# Patient Record
Sex: FEMALE | Race: WHITE | NOT HISPANIC OR LATINO | URBAN - METROPOLITAN AREA
[De-identification: names, ages, dates, MRNs, and addresses within clinical notes are randomized per-mention and may not be internally consistent; named-entity substitution may affect disease eponyms.]

---

## 2017-08-22 ENCOUNTER — IMPORTED ENCOUNTER (OUTPATIENT)
Dept: URBAN - METROPOLITAN AREA CLINIC 1 | Facility: CLINIC | Age: 47
End: 2017-08-22

## 2017-08-22 PROBLEM — H25.813: Noted: 2017-08-22

## 2017-08-22 PROBLEM — E11.9: Noted: 2017-08-22

## 2017-08-22 PROBLEM — H04.123: Noted: 2017-08-22

## 2017-08-22 PROBLEM — Z79.4: Noted: 2017-08-22

## 2017-08-22 PROCEDURE — 92015 DETERMINE REFRACTIVE STATE: CPT

## 2017-08-22 PROCEDURE — 92014 COMPRE OPH EXAM EST PT 1/>: CPT

## 2017-08-22 NOTE — PATIENT DISCUSSION
1.  DM Type II without sign of diabetic retinopathy and no blot heme on dilated retinal examination today OU No Macular Edema: Stable. Discussed the pathophysiology of diabetes and its effect on the eye and risk of blindness. Stressed the importance of strong glucose control. Advised of importance of at least yearly dilated examinations but to contact us immediately for any problems or concerns. 2. Type II Insulin dependent diabetes mellitus3. Dry Eyes OU- Much improved. The continuation of artificial tears were recommended. 4.  Cataract OU- Observe for now without intervention. The patient was advised to contact us if any change or worsening of vision5. 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 6.  Return for an appointment for a 30/HVf in 1 year with Dr. Munira Valle.

## 2018-08-21 ENCOUNTER — IMPORTED ENCOUNTER (OUTPATIENT)
Dept: URBAN - METROPOLITAN AREA CLINIC 1 | Facility: CLINIC | Age: 48
End: 2018-08-21

## 2018-08-21 PROBLEM — H04.123: Noted: 2018-08-21

## 2018-08-21 PROBLEM — E11.9: Noted: 2018-08-21

## 2018-08-21 PROBLEM — Z79.4: Noted: 2018-08-21

## 2018-08-21 PROBLEM — H25.813: Noted: 2018-08-21

## 2018-08-21 PROBLEM — H16.143: Noted: 2018-08-21

## 2018-08-21 PROCEDURE — 92083 EXTENDED VISUAL FIELD XM: CPT

## 2018-08-21 PROCEDURE — 92014 COMPRE OPH EXAM EST PT 1/>: CPT

## 2018-08-21 PROCEDURE — 92015 DETERMINE REFRACTIVE STATE: CPT

## 2018-08-21 NOTE — PATIENT DISCUSSION
1.  DM Type II without sign of diabetic retinopathy and no blot heme on dilated retinal examination today OU No Macular Edema: Stable. Discussed the pathophysiology of diabetes and its effect on the eye and risk of blindness. Stressed the importance of strong glucose control. Advised of importance of at least yearly dilated examinations but to contact us immediately for any problems or concerns. 2. Type II Insulin dependent diabetes mellitus3. REFUGIO OU now w/ PEK OU- The increase of artificial tears OU to QID were recommended routinely. Consider plugs if no  improvement. 4.  Cataract OU: Observe for now without intervention. The patient was advised to contact us if any change or worsening of vision5. Optic Nerve Head Drusen OU- Stable IOP OU. Normal HVF OU. 6. Return for an appointment for 10/check K in 6 months with Dr. Shanelle Corbett.

## 2019-03-08 ENCOUNTER — IMPORTED ENCOUNTER (OUTPATIENT)
Dept: URBAN - METROPOLITAN AREA CLINIC 1 | Facility: CLINIC | Age: 49
End: 2019-03-08

## 2019-03-08 PROBLEM — H25.813: Noted: 2019-03-08

## 2019-03-08 PROBLEM — H04.123: Noted: 2019-03-08

## 2019-03-08 PROBLEM — H16.143: Noted: 2019-03-08

## 2019-03-08 PROCEDURE — 99213 OFFICE O/P EST LOW 20 MIN: CPT

## 2019-03-08 NOTE — PATIENT DISCUSSION
1.  REFUGIO w/ improved PEK OU- Recommend ATs TID-QID OU routinely. Consider plugs if symptoms/PEK worsens 2. Cataract OU: Observe for now without intervention. The patient was advised to contact us if any change or worsening of vision3. H/o Dm w/o DR OU 4. H/o Optic Nerve Head Drusen OUReturn for an appointment in August 30 with Dr. Yaquelin Leong.

## 2019-08-20 ENCOUNTER — IMPORTED ENCOUNTER (OUTPATIENT)
Dept: URBAN - METROPOLITAN AREA CLINIC 1 | Facility: CLINIC | Age: 49
End: 2019-08-20

## 2019-08-20 PROBLEM — H04.123: Noted: 2019-08-20

## 2019-08-20 PROBLEM — Z79.4: Noted: 2019-08-20

## 2019-08-20 PROBLEM — H25.813: Noted: 2019-08-20

## 2019-08-20 PROBLEM — H16.143: Noted: 2019-08-20

## 2019-08-20 PROBLEM — E11.9: Noted: 2019-08-20

## 2019-08-20 PROCEDURE — 92014 COMPRE OPH EXAM EST PT 1/>: CPT

## 2019-08-20 NOTE — PATIENT DISCUSSION
1.  DM Type II (Insulin) without sign of diabetic retinopathy and no blot heme on dilated retinal examination today OU No Macular Edema:  Discussed the pathophysiology of diabetes and its effect on the eye and risk of blindness. Stressed the importance of strong glucose control. Advised of importance of at least yearly dilated examinations but to contact us immediately for any problems or concerns. 2. REFUGIO w/ PEK OU- Cont ATs TID-QID OU routinely 3. Cataract OU: Observe for now without intervention. The patient was advised to contact us if any change or worsening of vision4. Optic Nerve Head Drusen OUPatient defers the refraction at today's visitReturn for an appointment in 1 year 27 with Dr. Nishi De La Cruz.

## 2020-08-20 ENCOUNTER — IMPORTED ENCOUNTER (OUTPATIENT)
Dept: URBAN - METROPOLITAN AREA CLINIC 1 | Facility: CLINIC | Age: 50
End: 2020-08-20

## 2020-08-20 PROBLEM — E11.9: Noted: 2020-08-20

## 2020-08-20 PROBLEM — H04.123: Noted: 2020-08-20

## 2020-08-20 PROBLEM — H16.143: Noted: 2020-08-20

## 2020-08-20 PROBLEM — H25.813: Noted: 2020-08-20

## 2020-08-20 PROBLEM — Z79.4: Noted: 2020-08-20

## 2020-08-20 PROCEDURE — 92014 COMPRE OPH EXAM EST PT 1/>: CPT

## 2020-08-20 NOTE — PATIENT DISCUSSION
1.  DM Type II (Insulin) without sign of diabetic retinopathy and no blot heme on dilated retinal examination today OU No Macular Edema:  Discussed the pathophysiology of diabetes and its effect on the eye and risk of blindness. Stressed the importance of strong glucose control. Advised of importance of at least yearly dilated examinations but to contact us immediately for any problems or concerns. 2. Cataract OU - Observe for now without intervention. The patient was advised to contact us if any change or worsening of vision3. REFUGIO w/ PEK OU - Cont ATs TID-QID OU routinely 4. Optic Nerve Head Drusen OUPatient defers the refraction at today's visitLetter to PCP. Return for an appointment in 1 year for a 30 with Dr. Orville Wild.

## 2021-12-13 ENCOUNTER — IMPORTED ENCOUNTER (OUTPATIENT)
Dept: URBAN - METROPOLITAN AREA CLINIC 1 | Facility: CLINIC | Age: 51
End: 2021-12-13

## 2021-12-13 PROBLEM — H25.813: Noted: 2021-12-13

## 2021-12-13 PROBLEM — H40.033: Noted: 2021-12-13

## 2021-12-13 PROBLEM — H04.123: Noted: 2021-12-13

## 2021-12-13 PROBLEM — E11.9: Noted: 2021-12-13

## 2021-12-13 PROBLEM — Z79.4: Noted: 2021-12-13

## 2021-12-13 PROBLEM — H16.143: Noted: 2021-12-13

## 2021-12-13 PROCEDURE — 99214 OFFICE O/P EST MOD 30 MIN: CPT

## 2021-12-13 PROCEDURE — 92015 DETERMINE REFRACTIVE STATE: CPT

## 2021-12-13 NOTE — PATIENT DISCUSSION
1.  DM Type II (Insulin) without sign of diabetic retinopathy and no blot heme on dilated retinal examination today OU No Macular Edema:  Discussed the pathophysiology of diabetes and its effect on the eye and risk of blindness. Stressed the importance of strong glucose control. Advised of importance of at least yearly dilated examinations but to contact us immediately for any problems or concerns. 2. Cataract OU -- Observe for now without intervention. The patient was advised to contact us if any change or worsening of vision3. REFUGIO w/ PEK OU -- Cont ATs TID-QID OU routinely 4. Optic Nerve Head Drusen OU 5. Narrow Angle OU -- Observe. Return for an appointment in 1 year for a 30 with Dr. Marni Yang.

## 2022-04-02 ASSESSMENT — VISUAL ACUITY
OS_CC: 20/25-2
OS_CC: 20/20
OD_CC: 20/25-2
OD_CC: 20/20
OS_CC: 20/20
OS_CC: 20/20-1
OD_CC: 20/25
OS_CC: 20/20-1
OD_CC: 20/25
OD_CC: 20/20
OD_CC: 20/20-1
OS_CC: 20/25

## 2022-04-02 ASSESSMENT — TONOMETRY
OD_IOP_MMHG: 15
OS_IOP_MMHG: 18
OD_IOP_MMHG: 15
OD_IOP_MMHG: 18
OD_IOP_MMHG: 14
OS_IOP_MMHG: 15
OD_IOP_MMHG: 15
OD_IOP_MMHG: 15
OS_IOP_MMHG: 15
OS_IOP_MMHG: 14
OS_IOP_MMHG: 15
OD_IOP_MMHG: 14
OS_IOP_MMHG: 14
OS_IOP_MMHG: 15

## 2022-04-02 ASSESSMENT — KERATOMETRY
OS_AXISANGLE_DEGREES: 170
OS_K1POWER_DIOPTERS: 47.00
OS_K2POWER_DIOPTERS: 48.25
OD_AXISANGLE2_DEGREES: 097
OS_AXISANGLE2_DEGREES: 080
OD_K2POWER_DIOPTERS: 48.75
OD_AXISANGLE_DEGREES: 007
OD_K1POWER_DIOPTERS: 47.00

## 2022-12-13 ENCOUNTER — COMPREHENSIVE EXAM (OUTPATIENT)
Dept: URBAN - METROPOLITAN AREA CLINIC 1 | Facility: CLINIC | Age: 52
End: 2022-12-13

## 2022-12-13 PROCEDURE — 92014 COMPRE OPH EXAM EST PT 1/>: CPT

## 2022-12-13 ASSESSMENT — KERATOMETRY
OS_K1POWER_DIOPTERS: 47.00
OS_AXISANGLE_DEGREES: 170
OD_AXISANGLE2_DEGREES: 097
OS_AXISANGLE2_DEGREES: 080
OS_K2POWER_DIOPTERS: 48.25
OD_AXISANGLE_DEGREES: 007
OD_K1POWER_DIOPTERS: 47.00
OD_K2POWER_DIOPTERS: 48.75

## 2022-12-13 ASSESSMENT — TONOMETRY
OS_IOP_MMHG: 18
OD_IOP_MMHG: 18

## 2022-12-13 ASSESSMENT — VISUAL ACUITY
OS_SC: 20/25
OD_SC: 20/25

## 2023-12-14 ENCOUNTER — COMPREHENSIVE EXAM (OUTPATIENT)
Dept: URBAN - METROPOLITAN AREA CLINIC 1 | Facility: CLINIC | Age: 53
End: 2023-12-14

## 2023-12-14 DIAGNOSIS — H16.143: ICD-10-CM

## 2023-12-14 DIAGNOSIS — H25.813: ICD-10-CM

## 2023-12-14 DIAGNOSIS — E11.3292: ICD-10-CM

## 2023-12-14 DIAGNOSIS — H01.021: ICD-10-CM

## 2023-12-14 DIAGNOSIS — H04.123: ICD-10-CM

## 2023-12-14 DIAGNOSIS — Z79.4: ICD-10-CM

## 2023-12-14 DIAGNOSIS — H01.024: ICD-10-CM

## 2023-12-14 DIAGNOSIS — H40.033: ICD-10-CM

## 2023-12-14 PROCEDURE — 92015 DETERMINE REFRACTIVE STATE: CPT

## 2023-12-14 PROCEDURE — 92014 COMPRE OPH EXAM EST PT 1/>: CPT

## 2023-12-14 ASSESSMENT — TONOMETRY
OS_IOP_MMHG: 15
OD_IOP_MMHG: 14

## 2023-12-14 ASSESSMENT — VISUAL ACUITY
OS_SC: 20/20
OD_SC: 20/20

## 2024-12-16 ENCOUNTER — COMPREHENSIVE EXAM (OUTPATIENT)
Age: 54
End: 2024-12-16

## 2024-12-16 DIAGNOSIS — Z79.4: ICD-10-CM

## 2024-12-16 DIAGNOSIS — H04.123: ICD-10-CM

## 2024-12-16 DIAGNOSIS — E11.3292: ICD-10-CM

## 2024-12-16 DIAGNOSIS — H16.143: ICD-10-CM

## 2024-12-16 DIAGNOSIS — H25.813: ICD-10-CM

## 2024-12-16 DIAGNOSIS — H40.033: ICD-10-CM

## 2024-12-16 PROCEDURE — 92014 COMPRE OPH EXAM EST PT 1/>: CPT

## 2025-06-05 ENCOUNTER — HOSPITAL ENCOUNTER (OUTPATIENT)
Facility: HOSPITAL | Age: 55
Setting detail: RECURRING SERIES
Discharge: HOME | End: 2025-06-08
Payer: COMMERCIAL

## 2025-06-05 PROCEDURE — 97162 PT EVAL MOD COMPLEX 30 MIN: CPT

## 2025-06-05 NOTE — PROGRESS NOTES
assigned and reassessed every 10 visits/ 30 days per Medicare guidelines    Patient/ Caregiver education and instruction: Diagnosis, prognosis, self care and exercises [x]  Plan of care has been reviewed with HI Ocampo, PT       6/5/2025       8:13 AM  ===================================================================  I certify that the above Therapy Services are being furnished while the patient is under my care. I agree with the treatment plan and certify that this therapy is necessary.    Physician's Signature:_________________________   DATE:_________   TIME:________                           Maryjane Díaz, *    ** Signature, Date and Time must be completed for valid certification **  [facility] or you may fax the signed copy to [phone].  Thank you.      
toward goals / Updated goals:  [x]  See POC    Short Term Goals: To be accomplished in 2 treatments:   Pt will demonstrate 100% compliance with her initial HEP to promote improved carryover.   Status at IE: Given handout and reported understanding.     Long Term Goals: To be accomplished in 8-12 treatments:    Pt will demonstrate decreased pain 0/10 in order to improve ability to participate in recreational activities.  Status at IE: pain at worst 9/10, best 0/10.    Pt will demonstrate improved IR with adduction difference to 0 cm in order to improve ability to don/doff bra.  Status at IE: 10 cm difference.  3.      Pt will demonstrate scapular strength to 5/5 in order to improve posture.  Status at IE: Middle trap 3/5, Lower trap 3/5.  4.      Pt will demonstrate shoulder strength to 5/5 in order to improve ability to participate in recreational activities.  Status at IE: flexors, IR, ER, abduction 4/5.       PLAN  yes Continue plan of care  []  Upgrade activities as tolerated  []  Discharge due to :  [x]  Other: Pt to be seen 2x/week for 8-12 visits     Justification for Eval Code Complexity: Moderate.  Patient History: Moderate - Diabetes.  Examination see exam: High  Clinical Presentation: Moderate - evolving.  Clinical Decision Making : QD : 11.4 /100    Torri Ocampo PT    6/5/2025    8:12 AM    Future Appointments   Date Time Provider Department Center   6/5/2025 11:40 AM Torri Ocampo PT [facility]     If an interpreting service was utilized for treatment of this patient, the contents of this document represent the material reviewed with the patient via the .    Charge Capture

## 2025-06-12 ENCOUNTER — HOSPITAL ENCOUNTER (OUTPATIENT)
Facility: HOSPITAL | Age: 55
Setting detail: RECURRING SERIES
Discharge: HOME | End: 2025-06-15
Payer: COMMERCIAL

## 2025-06-12 PROCEDURE — 97110 THERAPEUTIC EXERCISES: CPT

## 2025-06-12 PROCEDURE — 97140 MANUAL THERAPY 1/> REGIONS: CPT

## 2025-06-12 PROCEDURE — 97530 THERAPEUTIC ACTIVITIES: CPT

## 2025-06-12 NOTE — PROGRESS NOTES
PHYSICAL / OCCUPATIONAL THERAPY - DAILY TREATMENT NOTE (updated )    Patient Name: Harriet Birmingham    Date: 2025    : 1970  Insurance: Payor: JUSTIN LOPEZ / Plan: [employer] / Product Type: *No Product type* /      Patient  verified yes     Visit #   Current / Total 2 8-12   Time   In / Out 5:23 6:15   Total Treatment Time 52   Pain   In / Out 4/10 3/10   Subjective Functional Status/Changes: It's not too bad right now.      NEXT PROGRESS NOTE DUE: 2025    TREATMENT AREA =  Left shoulder pain    OBJECTIVE    Modalities Rationale:     decrease inflammation and decrease pain to improve patient's ability to progress to PLOF and address remaining functional goals.     min [] Estim Unattended, type/location:                                      []  w/ice    []  w/heat    min [] Estim Attended, type/location:                                     []  w/US     []  w/ice    []  w/heat    []  TENS insruct      min []  Mechanical Traction: type/lbs                   []  pro   []  sup   []  int   []  cont    []  before manual    []  after manual    min []  Ultrasound, settings/location:      min []  Iontophoresis w/ dexamethasone, location:                                               []  take home patch       []  in clinic   10 min  unbilled [x]  Ice     []  Heat    location/position: Supine to Left shoulder    min []  Paraffin,  details:     min []  Vasopneumatic Device, press/temp:     min []  Whirlpool / Fluido:    If using vaso (only need to measure limb vaso being performed on)      pre-treatment girth :       post-treatment girth :       measured at (landmark location) :      min []  Other:    Skin assessment post-treatment (if applicable):    [x]  intact    []  redness- no adverse reaction                 []redness - adverse reaction:      Vasopnuematic compression justification:  Per bilateral girth measures taken and listed above the edema is considered significant and

## 2025-06-17 ENCOUNTER — HOSPITAL ENCOUNTER (OUTPATIENT)
Facility: HOSPITAL | Age: 55
Setting detail: RECURRING SERIES
Discharge: HOME | End: 2025-06-20
Payer: COMMERCIAL

## 2025-06-17 PROCEDURE — 97140 MANUAL THERAPY 1/> REGIONS: CPT | Performed by: PHYSICAL THERAPIST

## 2025-06-17 PROCEDURE — 97110 THERAPEUTIC EXERCISES: CPT | Performed by: PHYSICAL THERAPIST

## 2025-06-17 PROCEDURE — 97530 THERAPEUTIC ACTIVITIES: CPT | Performed by: PHYSICAL THERAPIST

## 2025-06-17 NOTE — PROGRESS NOTES
PHYSICAL / OCCUPATIONAL THERAPY - DAILY TREATMENT NOTE (updated )    Patient Name: Harriet Birmingham    Date: 2025    : 1970  Insurance: Payor: JUSTIN LOPEZ / Plan: [employer] / Product Type: *No Product type* /      Patient  verified yes     Visit #   Current / Total 3 8-12   Time   In / Out 700 804   Total Treatment Time 64   Pain   In / Out 2 0   Subjective Functional Status/Changes: Pt reports that she felt really good after her last appointment and then this weekend she moved quickly and her shoulder spasm..      NEXT PROGRESS NOTE DUE: 2025    TREATMENT AREA =  Left shoulder pain    OBJECTIVE    Modalities Rationale:     decrease inflammation and decrease pain to improve patient's ability to progress to PLOF and address remaining functional goals.     min [] Estim Unattended, type/location:                                      []  w/ice    []  w/heat    min [] Estim Attended, type/location:                                     []  w/US     []  w/ice    []  w/heat    []  TENS insruct      min []  Mechanical Traction: type/lbs                   []  pro   []  sup   []  int   []  cont    []  before manual    []  after manual    min []  Ultrasound, settings/location:      min []  Iontophoresis w/ dexamethasone, location:                                               []  take home patch       []  in clinic   15 min  unbilled [x]  Ice     []  Heat    location/position: Supine to Left shoulder    min []  Paraffin,  details:     min []  Vasopneumatic Device, press/temp:     min []  Whirlpool / Fluido:    If using vaso (only need to measure limb vaso being performed on)      pre-treatment girth :       post-treatment girth :       measured at (landmark location) :      min []  Other:    Skin assessment post-treatment (if applicable):    [x]  intact    []  redness- no adverse reaction                 []redness - adverse reaction:      Vasopnuematic compression justification:

## 2025-06-19 ENCOUNTER — HOSPITAL ENCOUNTER (OUTPATIENT)
Facility: HOSPITAL | Age: 55
Setting detail: RECURRING SERIES
Discharge: HOME | End: 2025-06-22
Payer: COMMERCIAL

## 2025-06-19 PROCEDURE — 97110 THERAPEUTIC EXERCISES: CPT

## 2025-06-19 PROCEDURE — 97530 THERAPEUTIC ACTIVITIES: CPT

## 2025-06-19 PROCEDURE — 97140 MANUAL THERAPY 1/> REGIONS: CPT

## 2025-06-19 NOTE — PROGRESS NOTES
PHYSICAL / OCCUPATIONAL THERAPY - DAILY TREATMENT NOTE (updated )    Patient Name: Harriet Birmingham    Date: 2025    : 1970  Insurance: Payor: JUSTIN LOPEZ / Plan: [employer] / Product Type: *No Product type* /      Patient  verified yes     Visit #   Current / Total 4 8-12   Time   In / Out 935 1040   Total Treatment Time 65   Pain   In / Out 2 1-2   Subjective Functional Status/Changes: Pt reports she sometimes feels a jolt or pulsing on the L side when at rest.     NEXT PROGRESS NOTE DUE: 2025    TREATMENT AREA =  Left shoulder pain    OBJECTIVE    Modalities Rationale:     decrease inflammation and decrease pain to improve patient's ability to progress to PLOF and address remaining functional goals.     min [] Estim Unattended, type/location:                                      []  w/ice    []  w/heat    min [] Estim Attended, type/location:                                     []  w/US     []  w/ice    []  w/heat    []  TENS insruct      min []  Mechanical Traction: type/lbs                   []  pro   []  sup   []  int   []  cont    []  before manual    []  after manual    min []  Ultrasound, settings/location:      min []  Iontophoresis w/ dexamethasone, location:                                               []  take home patch       []  in clinic   7 min  unbilled [x]  Ice     []  Heat    location/position: Supine to Left shoulder    min []  Paraffin,  details:     min []  Vasopneumatic Device, press/temp:     min []  Whirlpool / Fluido:    If using vaso (only need to measure limb vaso being performed on)      pre-treatment girth :       post-treatment girth :       measured at (landmark location) :      min []  Other:    Skin assessment post-treatment (if applicable):    [x]  intact    []  redness- no adverse reaction                 []redness - adverse reaction:      Vasopnuematic compression justification:  Per bilateral girth measures taken and listed above

## 2025-06-24 ENCOUNTER — HOSPITAL ENCOUNTER (OUTPATIENT)
Facility: HOSPITAL | Age: 55
Setting detail: RECURRING SERIES
Discharge: HOME | End: 2025-06-27
Payer: COMMERCIAL

## 2025-06-24 PROCEDURE — 97530 THERAPEUTIC ACTIVITIES: CPT

## 2025-06-24 PROCEDURE — 97140 MANUAL THERAPY 1/> REGIONS: CPT

## 2025-06-24 PROCEDURE — 97110 THERAPEUTIC EXERCISES: CPT

## 2025-06-24 PROCEDURE — 97112 NEUROMUSCULAR REEDUCATION: CPT

## 2025-06-24 NOTE — PROGRESS NOTES
PHYSICAL / OCCUPATIONAL THERAPY - DAILY TREATMENT NOTE (updated )    Patient Name: Harriet Birmingham    Date: 2025    : 1970  Insurance: Payor: JUSTIN LOPEZ / Plan: [employer] / Product Type: *No Product type* /      Patient  verified yes     Visit #   Current / Total 5 8-12   Time   In / Out 7:00 8:07   Total Treatment Time 67   Pain   In / Out 2 2   Subjective Functional Status/Changes: Tightness in the shoulder, usually upon waking     NEXT PROGRESS NOTE DUE: 2025    TREATMENT AREA =  Left shoulder pain    OBJECTIVE    Modalities Rationale:     decrease inflammation and decrease pain to improve patient's ability to progress to PLOF and address remaining functional goals.     min [] Estim Unattended, type/location:                                      []  w/ice    []  w/heat    min [] Estim Attended, type/location:                                     []  w/US     []  w/ice    []  w/heat    []  TENS insruct      min []  Mechanical Traction: type/lbs                   []  pro   []  sup   []  int   []  cont    []  before manual    []  after manual    min []  Ultrasound, settings/location:      min []  Iontophoresis w/ dexamethasone, location:                                               []  take home patch       []  in clinic   10 min  unbilled [x]  Ice     []  Heat    location/position: Supine to Left shoulder    min []  Paraffin,  details:     min []  Vasopneumatic Device, press/temp:     min []  Whirlpool / Fluido:    If using vaso (only need to measure limb vaso being performed on)      pre-treatment girth :       post-treatment girth :       measured at (landmark location) :      min []  Other:    Skin assessment post-treatment (if applicable):    [x]  intact    []  redness- no adverse reaction                 []redness - adverse reaction:      Vasopnuematic compression justification:  Per bilateral girth measures taken and listed above the edema is considered

## 2025-06-24 NOTE — PROGRESS NOTES
[facility]  [address] [place] [address]  Phone: [phone]   [phone]  PHYSICAL THERAPY PROGRESS NOTE  Patient Name: Harriet Birmingham : 1970   Treatment/Medical Diagnosis: Left shoulder pain   Referral Source: Maryjane Díaz, Sigifredo     Date of Initial Visit: 2025 Attended Visits: 5 Missed Visits: 0     SUMMARY OF TREATMENT  Pt attending 5 visits since their SOC for L shoulder pain, SOC on 2024. Therapy has consisted of therapeutic exercise, neuromuscular re-education, manual therapy, therapeutic activity, self care/home management, electrical stim unattended.    CURRENT STATUS    Subjective:   Pain at best 0/10, at worst 5/10  Movements/activities increasing pain: AROM ABD, fast unexpected movements  Subjective % improvement 50%     Subjective functional improvements: increased ease in clasping bra without pain, sleeping tolerance  Subjective remaining functional deficits: sleep positioning, AROM while hanging out in pool, clasping bra AROM    Objective Measurements:  SHOULDER AROM:  Flexion (R) 167 (L) 137  Abd (R) 170 (L) 125   FIR (R) T11 (L) L2  THIERRY (R)T4 (L) T2     SHOULDER MMT:  Flexion (R) 4+ (L) 4/5  Abd (R) 4+ (L) 4-  Mid trap (L) 4/5  Lower trap (L) P! 3/5     HEP compliance: semi-compliance     Assessment:  Pt making good progress since her SOC, they report 50% improvement since this time with a 5/10 max pain level during fast unexpected movements and ABD AROM activities. Lowest reported pain level is 0/10.  Functional improvements of clasping bra and pain during sleep are noted, though they remain to report/demonstrate difficulty with AROM during clasping bra and hanging out in pool, as well as sleeping tolerance.  They have demonstrated improvements in scapular strength, functional AROM, and reports of decreased overall pain levels. They will benefit from further skilled therapy to improve

## 2025-06-26 ENCOUNTER — APPOINTMENT (OUTPATIENT)
Facility: HOSPITAL | Age: 55
End: 2025-06-26
Payer: COMMERCIAL

## 2025-06-27 ENCOUNTER — HOSPITAL ENCOUNTER (OUTPATIENT)
Facility: HOSPITAL | Age: 55
Setting detail: RECURRING SERIES
Discharge: HOME | End: 2025-06-30
Payer: COMMERCIAL

## 2025-06-27 PROCEDURE — 97110 THERAPEUTIC EXERCISES: CPT

## 2025-06-27 PROCEDURE — 97530 THERAPEUTIC ACTIVITIES: CPT

## 2025-06-27 PROCEDURE — 97112 NEUROMUSCULAR REEDUCATION: CPT

## 2025-06-27 NOTE — PROGRESS NOTES
PHYSICAL / OCCUPATIONAL THERAPY - DAILY TREATMENT NOTE (updated )    Patient Name: Harriet Birmingham    Date: 2025    : 1970  Insurance: Payor: JUSTIN LOPEZ / Plan: [employer] / Product Type: *No Product type* /      Patient  verified yes     Visit #   Current / Total 1 8-12   Time   In / Out 7:00 757   Total Treatment Time 57   Pain   In / Out 1 0   Subjective Functional Status/Changes: Pain is not bad today.     NEXT PROGRESS NOTE DUE: 2025    TREATMENT AREA =  Left shoulder pain    OBJECTIVE    Modalities Rationale:     decrease inflammation and decrease pain to improve patient's ability to progress to PLOF and address remaining functional goals.     min [] Estim Unattended, type/location:                                      []  w/ice    []  w/heat    min [] Estim Attended, type/location:                                     []  w/US     []  w/ice    []  w/heat    []  TENS insruct      min []  Mechanical Traction: type/lbs                   []  pro   []  sup   []  int   []  cont    []  before manual    []  after manual    min []  Ultrasound, settings/location:      min []  Iontophoresis w/ dexamethasone, location:                                               []  take home patch       []  in clinic   10 min  unbilled [x]  Ice     []  Heat    location/position: Supine to Left shoulder    min []  Paraffin,  details:     min []  Vasopneumatic Device, press/temp:     min []  Whirlpool / Fluido:    If using vaso (only need to measure limb vaso being performed on)      pre-treatment girth :       post-treatment girth :       measured at (landmark location) :      min []  Other:    Skin assessment post-treatment (if applicable):    [x]  intact    []  redness- no adverse reaction                 []redness - adverse reaction:      Vasopnuematic compression justification:  Per bilateral girth measures taken and listed above the edema is considered significant and having an

## 2025-07-01 ENCOUNTER — HOSPITAL ENCOUNTER (OUTPATIENT)
Facility: HOSPITAL | Age: 55
Setting detail: RECURRING SERIES
Discharge: HOME | End: 2025-07-04
Payer: COMMERCIAL

## 2025-07-01 PROCEDURE — 97110 THERAPEUTIC EXERCISES: CPT

## 2025-07-01 PROCEDURE — 97530 THERAPEUTIC ACTIVITIES: CPT

## 2025-07-01 PROCEDURE — 97140 MANUAL THERAPY 1/> REGIONS: CPT

## 2025-07-01 NOTE — PROGRESS NOTES
PHYSICAL / OCCUPATIONAL THERAPY - DAILY TREATMENT NOTE (updated )    Patient Name: Harriet Birmingham    Date: 2025    : 1970  Insurance: Payor: JUSTIN LOPEZ / Plan: [employer] / Product Type: *No Product type* /      Patient  verified yes     Visit #   Current / Total 2 8-12   Time   In / Out 7:00 8:00   Total Treatment Time 60   Pain   In / Out 1 0   Subjective Functional Status/Changes: Mainly my problem is trying to get my bra on and off and reaching behind me I feel it to the front of my shoulder and to the deltoid      NEXT PROGRESS NOTE DUE: 2025    TREATMENT AREA =  Left shoulder pain    OBJECTIVE    Modalities Rationale:     decrease inflammation and decrease pain to improve patient's ability to progress to PLOF and address remaining functional goals.     min [] Estim Unattended, type/location:                                      []  w/ice    []  w/heat    min [] Estim Attended, type/location:                                     []  w/US     []  w/ice    []  w/heat    []  TENS insruct      min []  Mechanical Traction: type/lbs                   []  pro   []  sup   []  int   []  cont    []  before manual    []  after manual    min []  Ultrasound, settings/location:      min []  Iontophoresis w/ dexamethasone, location:                                               []  take home patch       []  in clinic   10 min  unbilled []  Ice     [x]  Heat    location/position: In sitting on table  to Left shoulder    min []  Paraffin,  details:     min []  Vasopneumatic Device, press/temp:     min []  Whirlpool / Fluido:    If using vaso (only need to measure limb vaso being performed on)      pre-treatment girth :       post-treatment girth :       measured at (landmark location) :      min []  Other:    Skin assessment post-treatment (if applicable):    [x]  intact    []  redness- no adverse reaction                 []redness - adverse reaction:      Vasopnuematic

## 2025-07-03 ENCOUNTER — HOSPITAL ENCOUNTER (OUTPATIENT)
Facility: HOSPITAL | Age: 55
Setting detail: RECURRING SERIES
Discharge: HOME | End: 2025-07-06
Payer: COMMERCIAL

## 2025-07-03 PROCEDURE — 97530 THERAPEUTIC ACTIVITIES: CPT

## 2025-07-03 PROCEDURE — 97140 MANUAL THERAPY 1/> REGIONS: CPT

## 2025-07-03 PROCEDURE — 97112 NEUROMUSCULAR REEDUCATION: CPT

## 2025-07-03 PROCEDURE — 97110 THERAPEUTIC EXERCISES: CPT

## 2025-07-03 NOTE — PROGRESS NOTES
PHYSICAL / OCCUPATIONAL THERAPY - DAILY TREATMENT NOTE (updated )    Patient Name: Harriet Birmingham    Date: 7/3/2025    : 1970  Insurance: Payor: JUSTIN LOPEZ / Plan: [employer] / Product Type: *No Product type* /      Patient  verified yes     Visit #   Current / Total 3 8-12   Time   In / Out 157 307   Total Treatment Time 70   Pain   In / Out 1 sore   Subjective Functional Status/Changes: Pt reports continued tightness in L shoulder     NEXT PROGRESS NOTE DUE: 2025    TREATMENT AREA =  Left shoulder pain    OBJECTIVE    Modalities Rationale:     decrease inflammation and decrease pain to improve patient's ability to progress to PLOF and address remaining functional goals.     min [] Estim Unattended, type/location:                                      []  w/ice    []  w/heat    min [] Estim Attended, type/location:                                     []  w/US     []  w/ice    []  w/heat    []  TENS insruct      min []  Mechanical Traction: type/lbs                   []  pro   []  sup   []  int   []  cont    []  before manual    []  after manual    min []  Ultrasound, settings/location:      min []  Iontophoresis w/ dexamethasone, location:                                               []  take home patch       []  in clinic   5 min  unbilled []  Ice     [x]  Heat    location/position: In sitting on table  to Left shoulder    min []  Paraffin,  details:     min []  Vasopneumatic Device, press/temp:     min []  Whirlpool / Fluido:    If using vaso (only need to measure limb vaso being performed on)      pre-treatment girth :       post-treatment girth :       measured at (landmark location) :      min []  Other:    Skin assessment post-treatment (if applicable):    [x]  intact    []  redness- no adverse reaction                 []redness - adverse reaction:      Vasopnuematic compression justification:  Per bilateral girth measures taken and listed above the edema is

## 2025-07-08 ENCOUNTER — HOSPITAL ENCOUNTER (OUTPATIENT)
Facility: HOSPITAL | Age: 55
Setting detail: RECURRING SERIES
Discharge: HOME | End: 2025-07-11
Payer: COMMERCIAL

## 2025-07-08 PROCEDURE — 97112 NEUROMUSCULAR REEDUCATION: CPT

## 2025-07-08 PROCEDURE — 97140 MANUAL THERAPY 1/> REGIONS: CPT

## 2025-07-08 PROCEDURE — 97110 THERAPEUTIC EXERCISES: CPT

## 2025-07-08 PROCEDURE — 97530 THERAPEUTIC ACTIVITIES: CPT

## 2025-07-08 NOTE — PROGRESS NOTES
PHYSICAL / OCCUPATIONAL THERAPY - DAILY TREATMENT NOTE (updated )    Patient Name: Harriet Birmingham    Date: 2025    : 1970  Insurance: Payor: JUSTIN LOPEZ / Plan: [facility] [employer] / Product Type: *No Product type* /      Patient  verified yes     Visit #   Current / Total 4 8-12   Time   In / Out 700 809 (7:59, 1:1)   Total Treatment Time 69' (59', 1:1)   Pain   In / Out 1-3 0-1   Subjective Functional Status/Changes: I've been doing my exercises, but it feels like it needs to be stretched. I think a lot of it is into the neck. I get a tingling/burning/sensitivity on the outside of my arm. My head just feels heavy     NEXT PROGRESS NOTE DUE: 2025    TREATMENT AREA =  Left shoulder pain    OBJECTIVE    Modalities Rationale:     decrease inflammation and decrease pain to improve patient's ability to progress to PLOF and address remaining functional goals.     min [] Estim Unattended, type/location:                                      []  w/ice    []  w/heat    min [] Estim Attended, type/location:                                     []  w/US     []  w/ice    []  w/heat    []  TENS insruct      min []  Mechanical Traction: type/lbs                   []  pro   []  sup   []  int   []  cont    []  before manual    []  after manual    min []  Ultrasound, settings/location:      min []  Iontophoresis w/ dexamethasone, location:                                               []  take home patch       []  in clinic   10 min  unbilled [x]  Ice     []  Heat    location/position: seated to Left shoulder    min []  Paraffin,  details:     min []  Vasopneumatic Device, press/temp:     min []  Whirlpool / Fluido:    If using vaso (only need to measure limb vaso being performed on)      pre-treatment girth :       post-treatment girth :       measured at (landmark location) :      min []  Other:    Skin assessment post-treatment (if applicable):    [x]  intact    []  redness- no adverse